# Patient Record
Sex: MALE | Race: WHITE | NOT HISPANIC OR LATINO | URBAN - METROPOLITAN AREA
[De-identification: names, ages, dates, MRNs, and addresses within clinical notes are randomized per-mention and may not be internally consistent; named-entity substitution may affect disease eponyms.]

---

## 2017-01-17 ENCOUNTER — OUTPATIENT (OUTPATIENT)
Dept: OUTPATIENT SERVICES | Facility: HOSPITAL | Age: 67
LOS: 1 days | End: 2017-01-17
Payer: COMMERCIAL

## 2017-01-17 VITALS
HEART RATE: 86 BPM | RESPIRATION RATE: 16 BRPM | TEMPERATURE: 99 F | SYSTOLIC BLOOD PRESSURE: 140 MMHG | DIASTOLIC BLOOD PRESSURE: 88 MMHG | WEIGHT: 177.91 LBS | HEIGHT: 69 IN | OXYGEN SATURATION: 97 %

## 2017-01-17 DIAGNOSIS — M87.052 IDIOPATHIC ASEPTIC NECROSIS OF LEFT FEMUR: ICD-10-CM

## 2017-01-17 DIAGNOSIS — Z01.818 ENCOUNTER FOR OTHER PREPROCEDURAL EXAMINATION: ICD-10-CM

## 2017-01-17 DIAGNOSIS — M16.12 UNILATERAL PRIMARY OSTEOARTHRITIS, LEFT HIP: ICD-10-CM

## 2017-01-17 DIAGNOSIS — Z85.820 PERSONAL HISTORY OF MALIGNANT MELANOMA OF SKIN: Chronic | ICD-10-CM

## 2017-01-17 DIAGNOSIS — Z98.890 OTHER SPECIFIED POSTPROCEDURAL STATES: Chronic | ICD-10-CM

## 2017-01-17 LAB
ANION GAP SERPL CALC-SCNC: 20 MMOL/L — HIGH (ref 5–17)
BLD GP AB SCN SERPL QL: NEGATIVE — SIGNIFICANT CHANGE UP
BUN SERPL-MCNC: 13 MG/DL — SIGNIFICANT CHANGE UP (ref 7–23)
CALCIUM SERPL-MCNC: 10.4 MG/DL — SIGNIFICANT CHANGE UP (ref 8.4–10.5)
CHLORIDE SERPL-SCNC: 96 MMOL/L — SIGNIFICANT CHANGE UP (ref 96–108)
CO2 SERPL-SCNC: 24 MMOL/L — SIGNIFICANT CHANGE UP (ref 22–31)
CREAT SERPL-MCNC: 0.99 MG/DL — SIGNIFICANT CHANGE UP (ref 0.5–1.3)
GLUCOSE SERPL-MCNC: 97 MG/DL — SIGNIFICANT CHANGE UP (ref 70–99)
HCT VFR BLD CALC: 46.7 % — SIGNIFICANT CHANGE UP (ref 39–50)
HGB BLD-MCNC: 16.4 G/DL — SIGNIFICANT CHANGE UP (ref 13–17)
MCHC RBC-ENTMCNC: 33.5 PG — SIGNIFICANT CHANGE UP (ref 27–34)
MCHC RBC-ENTMCNC: 35.1 GM/DL — SIGNIFICANT CHANGE UP (ref 32–36)
MCV RBC AUTO: 95.3 FL — SIGNIFICANT CHANGE UP (ref 80–100)
MRSA PCR RESULT.: SIGNIFICANT CHANGE UP
PLATELET # BLD AUTO: 331 K/UL — SIGNIFICANT CHANGE UP (ref 150–400)
POTASSIUM SERPL-MCNC: 4.3 MMOL/L — SIGNIFICANT CHANGE UP (ref 3.5–5.3)
POTASSIUM SERPL-SCNC: 4.3 MMOL/L — SIGNIFICANT CHANGE UP (ref 3.5–5.3)
RBC # BLD: 4.9 M/UL — SIGNIFICANT CHANGE UP (ref 4.2–5.8)
RBC # FLD: 12 % — SIGNIFICANT CHANGE UP (ref 10.3–14.5)
RH IG SCN BLD-IMP: POSITIVE — SIGNIFICANT CHANGE UP
S AUREUS DNA NOSE QL NAA+PROBE: DETECTED
SODIUM SERPL-SCNC: 139 MMOL/L — SIGNIFICANT CHANGE UP (ref 135–145)
WBC # BLD: 7.32 K/UL — SIGNIFICANT CHANGE UP (ref 3.8–10.5)
WBC # FLD AUTO: 7.32 K/UL — SIGNIFICANT CHANGE UP (ref 3.8–10.5)

## 2017-01-17 PROCEDURE — 86900 BLOOD TYPING SEROLOGIC ABO: CPT

## 2017-01-17 PROCEDURE — 86850 RBC ANTIBODY SCREEN: CPT

## 2017-01-17 PROCEDURE — 86901 BLOOD TYPING SEROLOGIC RH(D): CPT

## 2017-01-17 PROCEDURE — 87641 MR-STAPH DNA AMP PROBE: CPT

## 2017-01-17 PROCEDURE — 87640 STAPH A DNA AMP PROBE: CPT

## 2017-01-17 PROCEDURE — 85027 COMPLETE CBC AUTOMATED: CPT

## 2017-01-17 PROCEDURE — 80048 BASIC METABOLIC PNL TOTAL CA: CPT

## 2017-01-17 RX ORDER — ACETAMINOPHEN 500 MG
975 TABLET ORAL ONCE
Qty: 0 | Refills: 0 | Status: COMPLETED | OUTPATIENT
Start: 2017-02-04 | End: 2017-02-04

## 2017-01-17 RX ORDER — GABAPENTIN 400 MG/1
300 CAPSULE ORAL ONCE
Qty: 0 | Refills: 0 | Status: COMPLETED | OUTPATIENT
Start: 2017-02-04 | End: 2017-02-04

## 2017-01-17 RX ORDER — CEFAZOLIN SODIUM 1 G
2000 VIAL (EA) INJECTION ONCE
Qty: 0 | Refills: 0 | Status: DISCONTINUED | OUTPATIENT
Start: 2017-02-04 | End: 2017-02-05

## 2017-01-17 RX ORDER — TRAMADOL HYDROCHLORIDE 50 MG/1
50 TABLET ORAL ONCE
Qty: 0 | Refills: 0 | Status: DISCONTINUED | OUTPATIENT
Start: 2017-02-04 | End: 2017-02-04

## 2017-01-17 NOTE — H&P PST ADULT - HISTORY OF PRESENT ILLNESS
65 yo male with hip pain starting 1 yr ago, MRI shows avascular necrosis. Pain is now worsening and pt is scheduled for Left Hip replacement.

## 2017-01-17 NOTE — H&P PST ADULT - PSH
History of melanoma  removal of several melanoma  S/P knee surgery  left  S/P tendon repair  left arm

## 2017-01-17 NOTE — H&P PST ADULT - NSANTHOSAYNRD_GEN_A_CORE
No. JORJE screening performed.  STOP BANG Legend: 0-2 = LOW Risk; 3-4 = INTERMEDIATE Risk; 5-8 = HIGH Risk

## 2017-01-19 ENCOUNTER — OTHER (OUTPATIENT)
Age: 67
End: 2017-01-19

## 2017-01-19 RX ORDER — MUPIROCIN 20 MG/G
2 OINTMENT TOPICAL
Qty: 32 | Refills: 0 | Status: ACTIVE | COMMUNITY
Start: 2017-01-19 | End: 1900-01-01

## 2017-01-23 RX ORDER — TRAMADOL HYDROCHLORIDE 50 MG/1
50 TABLET, COATED ORAL
Qty: 30 | Refills: 1 | Status: ACTIVE | COMMUNITY
Start: 2017-01-23 | End: 1900-01-01

## 2017-01-28 PROBLEM — C43.9 MALIGNANT MELANOMA OF SKIN, UNSPECIFIED: Chronic | Status: ACTIVE | Noted: 2017-01-17

## 2017-01-28 PROBLEM — K21.9 GASTRO-ESOPHAGEAL REFLUX DISEASE WITHOUT ESOPHAGITIS: Chronic | Status: ACTIVE | Noted: 2017-01-17

## 2017-01-28 PROBLEM — I10 ESSENTIAL (PRIMARY) HYPERTENSION: Chronic | Status: ACTIVE | Noted: 2017-01-17

## 2017-01-31 ENCOUNTER — OUTPATIENT (OUTPATIENT)
Dept: OUTPATIENT SERVICES | Facility: HOSPITAL | Age: 67
LOS: 1 days | End: 2017-01-31
Payer: COMMERCIAL

## 2017-01-31 VITALS
WEIGHT: 177.91 LBS | TEMPERATURE: 99 F | HEART RATE: 102 BPM | HEIGHT: 69 IN | DIASTOLIC BLOOD PRESSURE: 102 MMHG | SYSTOLIC BLOOD PRESSURE: 172 MMHG | OXYGEN SATURATION: 98 % | RESPIRATION RATE: 18 BRPM

## 2017-01-31 DIAGNOSIS — Z98.890 OTHER SPECIFIED POSTPROCEDURAL STATES: Chronic | ICD-10-CM

## 2017-01-31 DIAGNOSIS — R93.1 ABNORMAL FINDINGS ON DIAGNOSTIC IMAGING OF HEART AND CORONARY CIRCULATION: ICD-10-CM

## 2017-01-31 DIAGNOSIS — Z85.820 PERSONAL HISTORY OF MALIGNANT MELANOMA OF SKIN: Chronic | ICD-10-CM

## 2017-01-31 PROCEDURE — C1894: CPT

## 2017-01-31 PROCEDURE — 93010 ELECTROCARDIOGRAM REPORT: CPT

## 2017-01-31 PROCEDURE — C1887: CPT

## 2017-01-31 PROCEDURE — 93005 ELECTROCARDIOGRAM TRACING: CPT

## 2017-01-31 PROCEDURE — 93458 L HRT ARTERY/VENTRICLE ANGIO: CPT | Mod: 26

## 2017-01-31 PROCEDURE — 93458 L HRT ARTERY/VENTRICLE ANGIO: CPT

## 2017-01-31 PROCEDURE — C1760: CPT

## 2017-01-31 PROCEDURE — C1769: CPT

## 2017-01-31 RX ORDER — SODIUM CHLORIDE 9 MG/ML
3 INJECTION INTRAMUSCULAR; INTRAVENOUS; SUBCUTANEOUS EVERY 8 HOURS
Qty: 0 | Refills: 0 | Status: DISCONTINUED | OUTPATIENT
Start: 2017-01-31 | End: 2017-02-15

## 2017-01-31 NOTE — H&P CARDIOLOGY - PMH
GERD (gastroesophageal reflux disease)    HTN (hypertension)    Melanoma    Melanoma    Osteoarthritis

## 2017-01-31 NOTE — H&P CARDIOLOGY - HISTORY OF PRESENT ILLNESS
67 yo male pt with PMHx of osteoarthritis, HTN, HLD planned to have left rip replacement on 2/4/17, now presents for cardiac clearance.   Pt reports he had abnormal EKG in Dr. Hillman office, referred to have cath. Currently denies chest pain or SOB.

## 2017-02-03 ENCOUNTER — RESULT REVIEW (OUTPATIENT)
Age: 67
End: 2017-02-03

## 2017-02-04 ENCOUNTER — APPOINTMENT (OUTPATIENT)
Dept: ORTHOPEDIC SURGERY | Facility: HOSPITAL | Age: 67
End: 2017-02-04

## 2017-02-04 ENCOUNTER — INPATIENT (INPATIENT)
Facility: HOSPITAL | Age: 67
LOS: 0 days | Discharge: HOME | DRG: 470 | End: 2017-02-05
Attending: ORTHOPAEDIC SURGERY | Admitting: ORTHOPAEDIC SURGERY
Payer: COMMERCIAL

## 2017-02-04 VITALS
TEMPERATURE: 98 F | HEART RATE: 79 BPM | SYSTOLIC BLOOD PRESSURE: 172 MMHG | WEIGHT: 177.91 LBS | HEIGHT: 69 IN | OXYGEN SATURATION: 99 % | DIASTOLIC BLOOD PRESSURE: 97 MMHG | RESPIRATION RATE: 18 BRPM

## 2017-02-04 DIAGNOSIS — M16.12 UNILATERAL PRIMARY OSTEOARTHRITIS, LEFT HIP: ICD-10-CM

## 2017-02-04 DIAGNOSIS — M87.052 IDIOPATHIC ASEPTIC NECROSIS OF LEFT FEMUR: ICD-10-CM

## 2017-02-04 DIAGNOSIS — Z98.890 OTHER SPECIFIED POSTPROCEDURAL STATES: Chronic | ICD-10-CM

## 2017-02-04 DIAGNOSIS — Z85.820 PERSONAL HISTORY OF MALIGNANT MELANOMA OF SKIN: Chronic | ICD-10-CM

## 2017-02-04 PROCEDURE — 27130 TOTAL HIP ARTHROPLASTY: CPT | Mod: LT

## 2017-02-04 PROCEDURE — 72170 X-RAY EXAM OF PELVIS: CPT | Mod: 26

## 2017-02-04 PROCEDURE — 88311 DECALCIFY TISSUE: CPT | Mod: 26

## 2017-02-04 PROCEDURE — 88305 TISSUE EXAM BY PATHOLOGIST: CPT | Mod: 26

## 2017-02-04 PROCEDURE — 27130 TOTAL HIP ARTHROPLASTY: CPT | Mod: AS,LT

## 2017-02-04 RX ORDER — ACETAMINOPHEN 500 MG
650 TABLET ORAL EVERY 6 HOURS
Qty: 0 | Refills: 0 | Status: DISCONTINUED | OUTPATIENT
Start: 2017-02-04 | End: 2017-02-05

## 2017-02-04 RX ORDER — CELECOXIB 200 MG/1
200 CAPSULE ORAL
Qty: 0 | Refills: 0 | Status: DISCONTINUED | OUTPATIENT
Start: 2017-02-06 | End: 2017-02-05

## 2017-02-04 RX ORDER — POLYETHYLENE GLYCOL 3350 17 G/17G
17 POWDER, FOR SOLUTION ORAL DAILY
Qty: 0 | Refills: 0 | Status: DISCONTINUED | OUTPATIENT
Start: 2017-02-04 | End: 2017-02-05

## 2017-02-04 RX ORDER — ATORVASTATIN CALCIUM 80 MG/1
1 TABLET, FILM COATED ORAL
Qty: 0 | Refills: 0 | COMMUNITY

## 2017-02-04 RX ORDER — LOSARTAN POTASSIUM 100 MG/1
50 TABLET, FILM COATED ORAL DAILY
Qty: 0 | Refills: 0 | Status: DISCONTINUED | OUTPATIENT
Start: 2017-02-04 | End: 2017-02-05

## 2017-02-04 RX ORDER — DOCUSATE SODIUM 100 MG
100 CAPSULE ORAL THREE TIMES A DAY
Qty: 0 | Refills: 0 | Status: DISCONTINUED | OUTPATIENT
Start: 2017-02-04 | End: 2017-02-05

## 2017-02-04 RX ORDER — MAGNESIUM HYDROXIDE 400 MG/1
30 TABLET, CHEWABLE ORAL DAILY
Qty: 0 | Refills: 0 | Status: DISCONTINUED | OUTPATIENT
Start: 2017-02-04 | End: 2017-02-05

## 2017-02-04 RX ORDER — TRAMADOL HYDROCHLORIDE 50 MG/1
50 TABLET ORAL EVERY 8 HOURS
Qty: 0 | Refills: 0 | Status: DISCONTINUED | OUTPATIENT
Start: 2017-02-04 | End: 2017-02-05

## 2017-02-04 RX ORDER — ONDANSETRON 8 MG/1
4 TABLET, FILM COATED ORAL ONCE
Qty: 0 | Refills: 0 | Status: DISCONTINUED | OUTPATIENT
Start: 2017-02-04 | End: 2017-02-04

## 2017-02-04 RX ORDER — HYDROMORPHONE HYDROCHLORIDE 2 MG/ML
1 INJECTION INTRAMUSCULAR; INTRAVENOUS; SUBCUTANEOUS EVERY 6 HOURS
Qty: 0 | Refills: 0 | Status: DISCONTINUED | OUTPATIENT
Start: 2017-02-04 | End: 2017-02-05

## 2017-02-04 RX ORDER — ACETAMINOPHEN 500 MG
1000 TABLET ORAL ONCE
Qty: 0 | Refills: 0 | Status: COMPLETED | OUTPATIENT
Start: 2017-02-04 | End: 2017-02-04

## 2017-02-04 RX ORDER — OXYCODONE HYDROCHLORIDE 5 MG/1
10 TABLET ORAL EVERY 4 HOURS
Qty: 0 | Refills: 0 | Status: DISCONTINUED | OUTPATIENT
Start: 2017-02-04 | End: 2017-02-05

## 2017-02-04 RX ORDER — SODIUM CHLORIDE 9 MG/ML
1000 INJECTION, SOLUTION INTRAVENOUS
Qty: 0 | Refills: 0 | Status: DISCONTINUED | OUTPATIENT
Start: 2017-02-04 | End: 2017-02-05

## 2017-02-04 RX ORDER — ASPIRIN/CALCIUM CARB/MAGNESIUM 324 MG
325 TABLET ORAL
Qty: 0 | Refills: 0 | Status: DISCONTINUED | OUTPATIENT
Start: 2017-02-04 | End: 2017-02-05

## 2017-02-04 RX ORDER — PANTOPRAZOLE SODIUM 20 MG/1
40 TABLET, DELAYED RELEASE ORAL DAILY
Qty: 0 | Refills: 0 | Status: DISCONTINUED | OUTPATIENT
Start: 2017-02-04 | End: 2017-02-05

## 2017-02-04 RX ORDER — SODIUM CHLORIDE 9 MG/ML
500 INJECTION INTRAMUSCULAR; INTRAVENOUS; SUBCUTANEOUS ONCE
Qty: 0 | Refills: 0 | Status: COMPLETED | OUTPATIENT
Start: 2017-02-05 | End: 2017-02-05

## 2017-02-04 RX ORDER — LOSARTAN POTASSIUM 100 MG/1
1 TABLET, FILM COATED ORAL
Qty: 0 | Refills: 0 | COMMUNITY

## 2017-02-04 RX ORDER — SENNA PLUS 8.6 MG/1
2 TABLET ORAL AT BEDTIME
Qty: 0 | Refills: 0 | Status: DISCONTINUED | OUTPATIENT
Start: 2017-02-04 | End: 2017-02-05

## 2017-02-04 RX ORDER — MORPHINE SULFATE 50 MG/1
4 CAPSULE, EXTENDED RELEASE ORAL ONCE
Qty: 0 | Refills: 0 | Status: DISCONTINUED | OUTPATIENT
Start: 2017-02-04 | End: 2017-02-04

## 2017-02-04 RX ORDER — ONDANSETRON 8 MG/1
4 TABLET, FILM COATED ORAL EVERY 6 HOURS
Qty: 0 | Refills: 0 | Status: DISCONTINUED | OUTPATIENT
Start: 2017-02-04 | End: 2017-02-05

## 2017-02-04 RX ORDER — CEFAZOLIN SODIUM 1 G
2000 VIAL (EA) INJECTION EVERY 8 HOURS
Qty: 0 | Refills: 0 | Status: COMPLETED | OUTPATIENT
Start: 2017-02-04 | End: 2017-02-05

## 2017-02-04 RX ORDER — SODIUM CHLORIDE 9 MG/ML
1000 INJECTION INTRAMUSCULAR; INTRAVENOUS; SUBCUTANEOUS ONCE
Qty: 0 | Refills: 0 | Status: DISCONTINUED | OUTPATIENT
Start: 2017-02-04 | End: 2017-02-04

## 2017-02-04 RX ORDER — OXYCODONE HYDROCHLORIDE 5 MG/1
5 TABLET ORAL EVERY 4 HOURS
Qty: 0 | Refills: 0 | Status: DISCONTINUED | OUTPATIENT
Start: 2017-02-04 | End: 2017-02-05

## 2017-02-04 RX ORDER — OXYCODONE HYDROCHLORIDE 5 MG/1
15 TABLET ORAL EVERY 4 HOURS
Qty: 0 | Refills: 0 | Status: DISCONTINUED | OUTPATIENT
Start: 2017-02-04 | End: 2017-02-05

## 2017-02-04 RX ORDER — HYDROMORPHONE HYDROCHLORIDE 2 MG/ML
0.5 INJECTION INTRAMUSCULAR; INTRAVENOUS; SUBCUTANEOUS
Qty: 0 | Refills: 0 | Status: DISCONTINUED | OUTPATIENT
Start: 2017-02-04 | End: 2017-02-04

## 2017-02-04 RX ORDER — METOPROLOL TARTRATE 50 MG
5 TABLET ORAL ONCE
Qty: 0 | Refills: 0 | Status: COMPLETED | OUTPATIENT
Start: 2017-02-04 | End: 2017-02-04

## 2017-02-04 RX ORDER — KETOROLAC TROMETHAMINE 30 MG/ML
15 SYRINGE (ML) INJECTION EVERY 8 HOURS
Qty: 0 | Refills: 0 | Status: COMPLETED | OUTPATIENT
Start: 2017-02-05 | End: 2017-02-05

## 2017-02-04 RX ORDER — SODIUM CHLORIDE 9 MG/ML
3 INJECTION INTRAMUSCULAR; INTRAVENOUS; SUBCUTANEOUS EVERY 8 HOURS
Qty: 0 | Refills: 0 | Status: DISCONTINUED | OUTPATIENT
Start: 2017-02-04 | End: 2017-02-04

## 2017-02-04 RX ORDER — ESOMEPRAZOLE MAGNESIUM 40 MG/1
1 CAPSULE, DELAYED RELEASE ORAL
Qty: 0 | Refills: 0 | COMMUNITY

## 2017-02-04 RX ADMIN — OXYCODONE HYDROCHLORIDE 10 MILLIGRAM(S): 5 TABLET ORAL at 19:51

## 2017-02-04 RX ADMIN — GABAPENTIN 300 MILLIGRAM(S): 400 CAPSULE ORAL at 12:02

## 2017-02-04 RX ADMIN — SODIUM CHLORIDE 100 MILLILITER(S): 9 INJECTION, SOLUTION INTRAVENOUS at 17:26

## 2017-02-04 RX ADMIN — HYDROMORPHONE HYDROCHLORIDE 0.5 MILLIGRAM(S): 2 INJECTION INTRAMUSCULAR; INTRAVENOUS; SUBCUTANEOUS at 16:55

## 2017-02-04 RX ADMIN — HYDROMORPHONE HYDROCHLORIDE 0.5 MILLIGRAM(S): 2 INJECTION INTRAMUSCULAR; INTRAVENOUS; SUBCUTANEOUS at 18:26

## 2017-02-04 RX ADMIN — HYDROMORPHONE HYDROCHLORIDE 0.5 MILLIGRAM(S): 2 INJECTION INTRAMUSCULAR; INTRAVENOUS; SUBCUTANEOUS at 16:00

## 2017-02-04 RX ADMIN — MORPHINE SULFATE 4 MILLIGRAM(S): 50 CAPSULE, EXTENDED RELEASE ORAL at 18:30

## 2017-02-04 RX ADMIN — OXYCODONE HYDROCHLORIDE 10 MILLIGRAM(S): 5 TABLET ORAL at 20:20

## 2017-02-04 RX ADMIN — MORPHINE SULFATE 4 MILLIGRAM(S): 50 CAPSULE, EXTENDED RELEASE ORAL at 19:12

## 2017-02-04 RX ADMIN — Medication 400 MILLIGRAM(S): at 21:33

## 2017-02-04 RX ADMIN — Medication 5 MILLIGRAM(S): at 17:30

## 2017-02-04 RX ADMIN — Medication 1000 MILLIGRAM(S): at 21:48

## 2017-02-04 RX ADMIN — Medication 325 MILLIGRAM(S): at 18:25

## 2017-02-04 RX ADMIN — Medication 100 MILLIGRAM(S): at 21:33

## 2017-02-04 RX ADMIN — Medication 975 MILLIGRAM(S): at 12:02

## 2017-02-04 RX ADMIN — HYDROMORPHONE HYDROCHLORIDE 0.5 MILLIGRAM(S): 2 INJECTION INTRAMUSCULAR; INTRAVENOUS; SUBCUTANEOUS at 17:00

## 2017-02-04 RX ADMIN — TRAMADOL HYDROCHLORIDE 50 MILLIGRAM(S): 50 TABLET ORAL at 12:02

## 2017-02-04 NOTE — PROVIDER CONTACT NOTE (OTHER) - SITUATION
Pt noted to have BP of 179/99, Pt states that "I took Losartan my BP mediation this morning", HR 69/mt, no C/O dizziness, headache, blurry vision, pt is asymptomatic

## 2017-02-05 ENCOUNTER — TRANSCRIPTION ENCOUNTER (OUTPATIENT)
Age: 67
End: 2017-02-05

## 2017-02-05 VITALS — SYSTOLIC BLOOD PRESSURE: 158 MMHG | DIASTOLIC BLOOD PRESSURE: 94 MMHG | HEART RATE: 81 BPM

## 2017-02-05 LAB
ANION GAP SERPL CALC-SCNC: 17 MMOL/L — SIGNIFICANT CHANGE UP (ref 5–17)
BUN SERPL-MCNC: 12 MG/DL — SIGNIFICANT CHANGE UP (ref 7–23)
CALCIUM SERPL-MCNC: 9.1 MG/DL — SIGNIFICANT CHANGE UP (ref 8.4–10.5)
CHLORIDE SERPL-SCNC: 101 MMOL/L — SIGNIFICANT CHANGE UP (ref 96–108)
CO2 SERPL-SCNC: 22 MMOL/L — SIGNIFICANT CHANGE UP (ref 22–31)
CREAT SERPL-MCNC: 0.84 MG/DL — SIGNIFICANT CHANGE UP (ref 0.5–1.3)
GLUCOSE SERPL-MCNC: 98 MG/DL — SIGNIFICANT CHANGE UP (ref 70–99)
HCT VFR BLD CALC: 37.1 % — LOW (ref 39–50)
HGB BLD-MCNC: 12.6 G/DL — LOW (ref 13–17)
MCHC RBC-ENTMCNC: 32.1 PG — SIGNIFICANT CHANGE UP (ref 27–34)
MCHC RBC-ENTMCNC: 34 GM/DL — SIGNIFICANT CHANGE UP (ref 32–36)
MCV RBC AUTO: 94.6 FL — SIGNIFICANT CHANGE UP (ref 80–100)
PLATELET # BLD AUTO: 316 K/UL — SIGNIFICANT CHANGE UP (ref 150–400)
POTASSIUM SERPL-MCNC: 4.2 MMOL/L — SIGNIFICANT CHANGE UP (ref 3.5–5.3)
POTASSIUM SERPL-SCNC: 4.2 MMOL/L — SIGNIFICANT CHANGE UP (ref 3.5–5.3)
RBC # BLD: 3.92 M/UL — LOW (ref 4.2–5.8)
RBC # FLD: 11.9 % — SIGNIFICANT CHANGE UP (ref 10.3–14.5)
SODIUM SERPL-SCNC: 140 MMOL/L — SIGNIFICANT CHANGE UP (ref 135–145)
WBC # BLD: 8.27 K/UL — SIGNIFICANT CHANGE UP (ref 3.8–10.5)
WBC # FLD AUTO: 8.27 K/UL — SIGNIFICANT CHANGE UP (ref 3.8–10.5)

## 2017-02-05 PROCEDURE — 72170 X-RAY EXAM OF PELVIS: CPT

## 2017-02-05 PROCEDURE — 80048 BASIC METABOLIC PNL TOTAL CA: CPT

## 2017-02-05 PROCEDURE — 85027 COMPLETE CBC AUTOMATED: CPT

## 2017-02-05 PROCEDURE — 88311 DECALCIFY TISSUE: CPT

## 2017-02-05 PROCEDURE — 86850 RBC ANTIBODY SCREEN: CPT

## 2017-02-05 PROCEDURE — 86900 BLOOD TYPING SEROLOGIC ABO: CPT

## 2017-02-05 PROCEDURE — 88305 TISSUE EXAM BY PATHOLOGIST: CPT

## 2017-02-05 PROCEDURE — 97161 PT EVAL LOW COMPLEX 20 MIN: CPT

## 2017-02-05 PROCEDURE — 76000 FLUOROSCOPY <1 HR PHYS/QHP: CPT

## 2017-02-05 PROCEDURE — 86901 BLOOD TYPING SEROLOGIC RH(D): CPT

## 2017-02-05 PROCEDURE — C1776: CPT

## 2017-02-05 PROCEDURE — C1889: CPT

## 2017-02-05 RX ORDER — TRAMADOL HYDROCHLORIDE 50 MG/1
1 TABLET ORAL
Qty: 21 | Refills: 0 | OUTPATIENT
Start: 2017-02-05

## 2017-02-05 RX ORDER — DOCUSATE SODIUM 100 MG
1 CAPSULE ORAL
Qty: 0 | Refills: 0 | COMMUNITY
Start: 2017-02-05

## 2017-02-05 RX ORDER — OXYCODONE HYDROCHLORIDE 5 MG/1
15 TABLET ORAL
Qty: 0 | Refills: 0 | Status: DISCONTINUED | OUTPATIENT
Start: 2017-02-05 | End: 2017-02-05

## 2017-02-05 RX ORDER — MORPHINE SULFATE 50 MG/1
1 CAPSULE, EXTENDED RELEASE ORAL
Qty: 6 | Refills: 0 | OUTPATIENT
Start: 2017-02-05 | End: 2017-02-08

## 2017-02-05 RX ORDER — SENNA PLUS 8.6 MG/1
2 TABLET ORAL
Qty: 0 | Refills: 0 | COMMUNITY
Start: 2017-02-05

## 2017-02-05 RX ORDER — TRAMADOL HYDROCHLORIDE 50 MG/1
1 TABLET ORAL
Qty: 0 | Refills: 0 | COMMUNITY

## 2017-02-05 RX ORDER — POLYETHYLENE GLYCOL 3350 17 G/17G
17 POWDER, FOR SOLUTION ORAL
Qty: 0 | Refills: 0 | COMMUNITY
Start: 2017-02-05

## 2017-02-05 RX ORDER — ACETAMINOPHEN 500 MG
2 TABLET ORAL
Qty: 0 | Refills: 0 | COMMUNITY
Start: 2017-02-05

## 2017-02-05 RX ORDER — MILK THISTLE 180 MG
1 CAPSULE ORAL
Qty: 0 | Refills: 0 | COMMUNITY

## 2017-02-05 RX ORDER — ASPIRIN/CALCIUM CARB/MAGNESIUM 324 MG
1 TABLET ORAL
Qty: 0 | Refills: 0 | COMMUNITY
Start: 2017-02-05

## 2017-02-05 RX ORDER — OXYCODONE HYDROCHLORIDE 5 MG/1
10 TABLET ORAL
Qty: 0 | Refills: 0 | Status: DISCONTINUED | OUTPATIENT
Start: 2017-02-05 | End: 2017-02-05

## 2017-02-05 RX ORDER — OXYCODONE HYDROCHLORIDE 5 MG/1
1 TABLET ORAL
Qty: 55 | Refills: 0 | OUTPATIENT
Start: 2017-02-05

## 2017-02-05 RX ORDER — MORPHINE SULFATE 50 MG/1
15 CAPSULE, EXTENDED RELEASE ORAL EVERY 12 HOURS
Qty: 0 | Refills: 0 | Status: DISCONTINUED | OUTPATIENT
Start: 2017-02-05 | End: 2017-02-05

## 2017-02-05 RX ADMIN — OXYCODONE HYDROCHLORIDE 15 MILLIGRAM(S): 5 TABLET ORAL at 00:45

## 2017-02-05 RX ADMIN — Medication 100 MILLIGRAM(S): at 05:15

## 2017-02-05 RX ADMIN — SODIUM CHLORIDE 600 MILLILITER(S): 9 INJECTION INTRAMUSCULAR; INTRAVENOUS; SUBCUTANEOUS at 06:11

## 2017-02-05 RX ADMIN — MORPHINE SULFATE 15 MILLIGRAM(S): 50 CAPSULE, EXTENDED RELEASE ORAL at 09:47

## 2017-02-05 RX ADMIN — HYDROMORPHONE HYDROCHLORIDE 1 MILLIGRAM(S): 2 INJECTION INTRAMUSCULAR; INTRAVENOUS; SUBCUTANEOUS at 03:31

## 2017-02-05 RX ADMIN — OXYCODONE HYDROCHLORIDE 15 MILLIGRAM(S): 5 TABLET ORAL at 05:46

## 2017-02-05 RX ADMIN — PANTOPRAZOLE SODIUM 40 MILLIGRAM(S): 20 TABLET, DELAYED RELEASE ORAL at 11:48

## 2017-02-05 RX ADMIN — Medication 1 TABLET(S): at 11:48

## 2017-02-05 RX ADMIN — OXYCODONE HYDROCHLORIDE 15 MILLIGRAM(S): 5 TABLET ORAL at 05:16

## 2017-02-05 RX ADMIN — Medication 325 MILLIGRAM(S): at 05:15

## 2017-02-05 RX ADMIN — LOSARTAN POTASSIUM 50 MILLIGRAM(S): 100 TABLET, FILM COATED ORAL at 05:15

## 2017-02-05 RX ADMIN — TRAMADOL HYDROCHLORIDE 50 MILLIGRAM(S): 50 TABLET ORAL at 05:45

## 2017-02-05 RX ADMIN — TRAMADOL HYDROCHLORIDE 50 MILLIGRAM(S): 50 TABLET ORAL at 05:15

## 2017-02-05 RX ADMIN — OXYCODONE HYDROCHLORIDE 15 MILLIGRAM(S): 5 TABLET ORAL at 00:15

## 2017-02-05 RX ADMIN — HYDROMORPHONE HYDROCHLORIDE 1 MILLIGRAM(S): 2 INJECTION INTRAMUSCULAR; INTRAVENOUS; SUBCUTANEOUS at 03:46

## 2017-02-05 RX ADMIN — Medication 15 MILLIGRAM(S): at 05:45

## 2017-02-05 RX ADMIN — Medication 15 MILLIGRAM(S): at 05:15

## 2017-02-05 RX ADMIN — MORPHINE SULFATE 15 MILLIGRAM(S): 50 CAPSULE, EXTENDED RELEASE ORAL at 11:50

## 2017-02-05 RX ADMIN — Medication 100 MILLIGRAM(S): at 11:48

## 2017-02-05 RX ADMIN — TRAMADOL HYDROCHLORIDE 50 MILLIGRAM(S): 50 TABLET ORAL at 13:35

## 2017-02-05 RX ADMIN — TRAMADOL HYDROCHLORIDE 50 MILLIGRAM(S): 50 TABLET ORAL at 13:36

## 2017-02-05 RX ADMIN — OXYCODONE HYDROCHLORIDE 15 MILLIGRAM(S): 5 TABLET ORAL at 09:16

## 2017-02-05 NOTE — PHYSICAL THERAPY INITIAL EVALUATION ADULT - ADDITIONAL COMMENTS
Pt resides in a prive home with his wife.  Has 2 floors, 3 steps to enter and 12 to second floor.  Son lives next door and is available to help. Pt resides in a prive home with his wife.  Has 2 floors, 3 steps to enter and 12 to second floor.  Son lives next door and is available to help.  Has a rolling walker.

## 2017-02-05 NOTE — DISCHARGE NOTE ADULT - PLAN OF CARE
improved ambulation and pain control Weight bearing as tolerated (Anterior precautions)  Please drop and dangle leg Q8hr for 45 min at a time, ankle pumps, quad sets, gluteal clenches and leg lifts  see discharge instructions

## 2017-02-05 NOTE — DISCHARGE NOTE ADULT - CARE PROVIDERS DIRECT ADDRESSES
[email or web address]

## 2017-02-05 NOTE — PROVIDER CONTACT NOTE (OTHER) - RECOMMENDATIONS
Notify MD; come assess & talk to pt. Notify MD; come assess & talk to pt. RN reinforced need to remain in hospital over night for pt safety.

## 2017-02-05 NOTE — DISCHARGE NOTE ADULT - HOME CARE AGENCY
[place] [phone] for RN visit to assess for home P/T for start of care the day after discharge

## 2017-02-05 NOTE — PROVIDER CONTACT NOTE (OTHER) - ASSESSMENT
Pt c/o being uncomfortable, c/o roommate is disruptive & requesting to be D/C'ed tonight. Pt explained need to assess vital signs, work with PT to evaluate strength & discharge needs. Pt also DTV @ 2300. Pt explained need to void to prevent need for Barry cath for urine retention.

## 2017-02-05 NOTE — DISCHARGE NOTE ADULT - ADDITIONAL INSTRUCTIONS
Keep Aquacel dressing clean and dry   ice to affected incision every 4-6 hours x 72 hours   Follow up with Dr Grider in 10 DAYS for dressing REMOVAL, wound check, and staple/suture removal (if applicable)   Follow up with your private internist / PMD in 4-6 weeks re: general checkup (and possible medication adjustment) Keep Aquacel dressing clean and dry   ice to affected incision every 4-6 hours x 72 hours   Continue ECOTRIN 325 mg by mouth 2x / day (at breakfast and at dinner) for a total of 6WEEKS   Follow up with Dr Grider in 10 DAYS for dressing REMOVAL, wound check, and staple/suture removal (if applicable)   Follow up with your private internist / PMD in 4-6 weeks re: general checkup (and possible medication adjustment)

## 2017-02-05 NOTE — DISCHARGE NOTE ADULT - CARE PLAN
Principal Discharge DX:	Idiopathic aseptic necrosis of femur, left  Goal:	improved ambulation and pain control  Instructions for follow-up, activity and diet:	Weight bearing as tolerated (Anterior precautions)  Please drop and dangle leg Q8hr for 45 min at a time, ankle pumps, quad sets, gluteal clenches and leg lifts  see discharge instructions  Secondary Diagnosis:	Primary osteoarthritis of left hip

## 2017-02-05 NOTE — DISCHARGE NOTE ADULT - CARE PROVIDER_API CALL
Gorge Grider), Orthopedics  [address] [place] [address]  Phone: [phone]  Fax: [phone]

## 2017-02-05 NOTE — DISCHARGE NOTE ADULT - NS AS ACTIVITY OBS
Walking-Indoors allowed/Stairs allowed/Walking-Outdoors allowed/Do not make important decisions/sponge bath only until OK w/ Surgeon  Call MD for excessive pain, persistent fevers, pain NOT relieved by medications.  Do not drive or lift any heavy objects/No Heavy lifting/straining

## 2017-02-05 NOTE — DISCHARGE NOTE ADULT - PATIENT PORTAL LINK FT
“You can access the FollowHealth Patient Portal, offered by [facility], by registering with the following website: [email or web address]”

## 2017-02-05 NOTE — PROVIDER CONTACT NOTE (OTHER) - ACTION/TREATMENT ORDERED:
MD made aware & came to talk with pt. MD made aware & came to talk with pt. MD ordered 15mg Oxy & Dilaudid 1mg IVP for breakthrough. RN will talk to roommate about keeping noise down. Pt states understanding of need to stay in hospital.

## 2017-02-05 NOTE — DISCHARGE NOTE ADULT - HOSPITAL COURSE
Chief Complaint/Reason for Admission	I have a bad hip	    History of Present Illness:  HPI		  67 yo male with hip pain starting 1 yr ago, MRI shows avascular necrosis. Pain is now worsening and pt is scheduled for Left Hip replacement.      Allergies/Medications:   Allergies:        Allergies:  	No Known Allergies:     Home Medications:   * Patient Currently Takes Medications as of 17-Jan-2017 09:08 documented in Prescription Writer  · 	losartan 50 mg oral tablet: Last Dose Taken:  , 1 tab(s) orally once a day  · 	NexIUM 40 mg oral delayed release capsule: Last Dose Taken:  , 1 cap(s) orally once a day  · 	traMADol 50 mg oral tablet: Last Dose Taken:  , 1 tab(s) orally 2 times a day    PMH/PSH/FH/SH:   Past Medical History:  GERD (gastroesophageal reflux disease)    HTN (hypertension)    Melanoma.    Past Surgical History:  History of melanoma  removal of several melanoma  S/P knee surgery  left  S/P tendon repair  left arm.    Hospital Course:   2/4:  Pt underwent L WON ([facility]) w/ Dr Grider. No complications  2/5:  Eval by PT:  WBAT         Pt cleared and stable for d/c -> home Chief Complaint/Reason for Admission	I have a bad hip	    History of Present Illness:  HPI		  65 yo male with hip pain starting 1 yr ago, MRI shows avascular necrosis. Pain is now worsening and pt is scheduled for Left Hip replacement.      Allergies/Medications:   Allergies:        Allergies:  	No Known Allergies:     Home Medications:   * Patient Currently Takes Medications as of 17-Jan-2017 09:08 documented in Prescription Writer  · 	losartan 50 mg oral tablet: Last Dose Taken:  , 1 tab(s) orally once a day  · 	NexIUM 40 mg oral delayed release capsule: Last Dose Taken:  , 1 cap(s) orally once a day  · 	traMADol 50 mg oral tablet: Last Dose Taken:  , 1 tab(s) orally 2 times a day    PMH/PSH/FH/SH:   Past Medical History:  GERD (gastroesophageal reflux disease)    HTN (hypertension)    Melanoma.    Past Surgical History:  History of melanoma  removal of several melanoma  S/P knee surgery  left  S/P tendon repair  left arm.    Hospital Course:   2/4:  Pt underwent L WON (BRENNA) w/ Dr Grider. No complications  2/5:  Eval by PT:  WBAT         Pt cleared and stable for d/c -> home

## 2017-02-08 LAB — SURGICAL PATHOLOGY STUDY: SIGNIFICANT CHANGE UP

## 2017-02-08 RX ORDER — OXYCODONE AND ACETAMINOPHEN 5; 325 MG/1; MG/1
5-325 TABLET ORAL
Qty: 20 | Refills: 0 | Status: ACTIVE | COMMUNITY
Start: 2017-02-08 | End: 1900-01-01

## 2017-02-14 ENCOUNTER — APPOINTMENT (OUTPATIENT)
Dept: ORTHOPEDIC SURGERY | Facility: CLINIC | Age: 67
End: 2017-02-14

## 2017-02-14 VITALS — HEIGHT: 69 IN | WEIGHT: 175 LBS | BODY MASS INDEX: 25.92 KG/M2

## 2017-02-14 DIAGNOSIS — Z96.642 PRESENCE OF LEFT ARTIFICIAL HIP JOINT: ICD-10-CM

## 2017-02-14 RX ORDER — ALPRAZOLAM 2 MG/1
2 TABLET ORAL
Qty: 10 | Refills: 0 | Status: ACTIVE | COMMUNITY
Start: 2017-02-14 | End: 1900-01-01

## 2017-02-15 RX ORDER — OXYCODONE AND ACETAMINOPHEN 5; 325 MG/1; MG/1
5-325 TABLET ORAL
Qty: 30 | Refills: 0 | Status: ACTIVE | COMMUNITY
Start: 2017-02-15 | End: 1900-01-01

## 2017-03-08 ENCOUNTER — APPOINTMENT (OUTPATIENT)
Dept: ORTHOPEDIC SURGERY | Facility: CLINIC | Age: 67
End: 2017-03-08

## 2017-03-08 DIAGNOSIS — Z96.642 PRESENCE OF LEFT ARTIFICIAL HIP JOINT: ICD-10-CM

## 2017-03-08 RX ORDER — DIAZEPAM 2 MG/1
2 TABLET ORAL
Qty: 20 | Refills: 0 | Status: ACTIVE | COMMUNITY
Start: 2017-03-08 | End: 1900-01-01

## 2017-05-18 ENCOUNTER — APPOINTMENT (OUTPATIENT)
Dept: ORTHOPEDIC SURGERY | Facility: CLINIC | Age: 67
End: 2017-05-18

## 2017-06-06 ENCOUNTER — APPOINTMENT (OUTPATIENT)
Dept: ORTHOPEDIC SURGERY | Facility: CLINIC | Age: 67
End: 2017-06-06

## 2017-06-10 NOTE — PRE-OP CHECKLIST - HAIR REMOVAL
06/15/17        Елена Weber  [address] [place] [id number]          Patient:  Елена Weber : 1957  MR#: [id number]      Dear Елена:    We have been unable to reach you by phone.  Please contact the office at your earliest convenience to discuss recent lab results, it is very important for you to return a call to us.      Sincerely,          Torres Hughes MD  [address] [address]  [phone]  
hair removal not indicated

## 2018-04-25 ENCOUNTER — APPOINTMENT (OUTPATIENT)
Dept: ORTHOPEDIC SURGERY | Facility: CLINIC | Age: 68
End: 2018-04-25
Payer: MEDICARE

## 2018-04-25 VITALS — BODY MASS INDEX: 25.92 KG/M2 | WEIGHT: 175 LBS | HEIGHT: 69 IN

## 2018-04-25 DIAGNOSIS — M25.551 PAIN IN RIGHT HIP: ICD-10-CM

## 2018-04-25 PROCEDURE — 99214 OFFICE O/P EST MOD 30 MIN: CPT

## 2018-04-25 PROCEDURE — 73502 X-RAY EXAM HIP UNI 2-3 VIEWS: CPT | Mod: RT

## 2019-01-24 PROBLEM — C43.9 MALIGNANT MELANOMA OF SKIN, UNSPECIFIED: Chronic | Status: ACTIVE | Noted: 2017-01-31

## 2019-01-24 PROBLEM — M19.90 UNSPECIFIED OSTEOARTHRITIS, UNSPECIFIED SITE: Chronic | Status: ACTIVE | Noted: 2017-01-31

## 2019-01-30 ENCOUNTER — APPOINTMENT (OUTPATIENT)
Dept: ORTHOPEDIC SURGERY | Facility: CLINIC | Age: 69
End: 2019-01-30
Payer: MEDICARE

## 2019-01-30 VITALS
HEIGHT: 69 IN | SYSTOLIC BLOOD PRESSURE: 168 MMHG | BODY MASS INDEX: 27.85 KG/M2 | WEIGHT: 188 LBS | HEART RATE: 88 BPM | DIASTOLIC BLOOD PRESSURE: 99 MMHG

## 2019-01-30 LAB — ERYTHROCYTE [SEDIMENTATION RATE] IN BLOOD BY WESTERGREN METHOD: 3 MM/HR

## 2019-01-30 PROCEDURE — 99214 OFFICE O/P EST MOD 30 MIN: CPT

## 2019-01-30 PROCEDURE — 73502 X-RAY EXAM HIP UNI 2-3 VIEWS: CPT

## 2019-01-30 NOTE — PHYSICAL EXAM
[de-identified] : Patient is well nourished, well-developed, in no acute distress, with appropriate mood and affect. The patient is oriented to time, place, and person. Respirations are even and unlabored. Gait evaluation does not reveal a limp. There is no inguinal adenopathy. Examination of the contralateral hip shows normal range of motion, strength, no tenderness, and intact skin. The affected limb is well-perfused and showed 2+ dp/pt pulses, without skin lesions, shows a grossly normal motor and sensory examination. Examination of the hip shows a well-healed anteriorly based surgical scar. Hip motion is full and painless from 0-90 degrees extension to flexion, 20 degrees adduction and 20 degrees abduction, and 15 degrees internal and 30 degrees external rotation. Leg lengths are approximately equal. FADIR is negative and KAYLEEN is negative. Stinchfield test is negative. Both hips are stable and muscle strength is normal with good strength with resisted abduction and adduction. Pedal pulses are palpable. [de-identified] : AP pelvis, AP hip, and lateral x-rays of the left hip were reviewed. Satisfactory position and alignment of the components are present. No signs of loosening are seen.  Chauhan grade 3 heterotopic ossification is seen.

## 2019-01-30 NOTE — DISCUSSION/SUMMARY
[de-identified] : This patient has a painful left total hip arthroplasty with heterotopic ossification.  He is never had an infection workup and so today we performed an ESR and CRP.  I also would like to obtain a bone scan to evaluate for component loosening.  Follow-up after the above studies are performed to review imaging and plan next steps.

## 2019-01-30 NOTE — HISTORY OF PRESENT ILLNESS
[de-identified] : This is very nice 68-year-old gentleman experiencing chronic left hip and thigh pain, which is severe in intensity.  He underwent a left total hip arthroplasty by Dr. Grider approximately 2 years ago and never fully recovered.  He has chronic numbness in the lateral femoral cutaneous nerve distribution.  He complains of thigh stiffness and pain with exercise.  This is not start up pain.  The pain is intermittent but does limit his activities of daily living when it is present and walking tolerance is somewhat reduced.  Despite this he still able to bicycle ride.  He does not have groin pain.  The pain does not radiate down his leg and it is not associated with numbness or tingling or weakness.  He does not use a cane or walker and has not recently done any physical therapy.  No fevers or chills.

## 2019-01-31 ENCOUNTER — OTHER (OUTPATIENT)
Age: 69
End: 2019-01-31

## 2019-01-31 LAB — CRP SERPL-MCNC: 0.14 MG/DL

## 2019-02-27 ENCOUNTER — APPOINTMENT (OUTPATIENT)
Dept: ORTHOPEDIC SURGERY | Facility: CLINIC | Age: 69
End: 2019-02-27
Payer: MEDICARE

## 2019-02-27 VITALS — HEIGHT: 69 IN | BODY MASS INDEX: 27.85 KG/M2 | WEIGHT: 188 LBS

## 2019-02-27 DIAGNOSIS — M54.5 LOW BACK PAIN: ICD-10-CM

## 2019-02-27 DIAGNOSIS — M87.052 IDIOPATHIC ASEPTIC NECROSIS OF LEFT FEMUR: ICD-10-CM

## 2019-02-27 DIAGNOSIS — Z96.649 PAIN DUE TO INTERNAL ORTHOPEDIC PROSTHETIC DEVICES, IMPLANTS AND GRAFTS, INITIAL ENCOUNTER: ICD-10-CM

## 2019-02-27 DIAGNOSIS — T84.84XA PAIN DUE TO INTERNAL ORTHOPEDIC PROSTHETIC DEVICES, IMPLANTS AND GRAFTS, INITIAL ENCOUNTER: ICD-10-CM

## 2019-02-27 PROCEDURE — 99214 OFFICE O/P EST MOD 30 MIN: CPT

## 2019-02-27 NOTE — HISTORY OF PRESENT ILLNESS
[de-identified] : This is very nice 68-year-old gentleman experiencing chronic left hip and thigh pain, which is severe in intensity.  He underwent a left total hip arthroplasty by Dr. Grider approximately 2 years ago and never fully recovered.  He has chronic numbness in the lateral femoral cutaneous nerve distribution.  He complains of thigh stiffness and pain with exercise.  This is not start up pain.  The pain mainly comes and goes when he is standing still and is intermittent but does limit his activities of daily living when it is present and walking tolerance is somewhat reduced.  Despite this he still able to bicycle ride.  He does not have groin pain.  The pain does not radiate down his leg and it is not associated with numbness or tingling or weakness.  He does not use a cane or walker and has not recently done any physical therapy.  No fevers or chills.  We previously performed an ESR and CRP on him which were normal.  He comes in today to a reviewing of the bone scan.

## 2019-02-27 NOTE — REASON FOR VISIT
[Follow-Up Visit] : a follow-up visit for [Artificial Hip Joint] : artificial hip joint [Hip Pain] : hip pain

## 2019-02-27 NOTE — PHYSICAL EXAM
[de-identified] : Patient is well nourished, well-developed, in no acute distress, with appropriate mood and affect. The patient is oriented to time, place, and person. Respirations are even and unlabored. Gait evaluation does not reveal a limp. There is no inguinal adenopathy. Examination of the contralateral hip shows normal range of motion, strength, no tenderness, and intact skin. The affected limb is well-perfused and showed 2+ dp/pt pulses, without skin lesions, shows a grossly normal motor and sensory examination. Examination of the hip shows a well-healed anteriorly based surgical scar. Hip motion is full and painless from 0-90 degrees extension to flexion, 20 degrees adduction and 20 degrees abduction, and 15 degrees internal and 30 degrees external rotation. Leg lengths are approximately equal. FADIR is negative and KAYLEEN is negative. Stinchfield test is negative. Both hips are stable and muscle strength is normal with good strength with resisted abduction and adduction. Pedal pulses are palpable. [de-identified] : AP pelvis, AP hip, and lateral x-rays of the left hip were reviewed. Satisfactory position and alignment of the components are present. No signs of loosening are seen.  Chauhan grade 3 heterotopic ossification is seen.\par \par Patient brings with him a report of the bone scan which shows mildly increased uptake around portions of the stem but not the entire stem on the third phase of the bone scan but not on the first 2 phases which is only mildly consistent with possible loosening it is not definitive for loosening.  Also shows increased uptake around heterotopic ossification indicating that this is still hot.

## 2019-02-27 NOTE — DISCUSSION/SUMMARY
[de-identified] : This patient has a painful left total hip arthroplasty with heterotopic ossification which is still forming.  No evidence of infection.  The bone scan was inconclusive.  I would like to prescribe him Celebrex to see if this helps improve the pain.  He does report to me that he has a history of a spine injury and so I would like to obtain an MRI of the lumbosacral spine to evaluate for a spinal stenosis.  After he obtains these images he will come back in for another follow-up so that we would comply next steps.

## 2019-03-27 ENCOUNTER — RX RENEWAL (OUTPATIENT)
Age: 69
End: 2019-03-27

## 2019-04-03 ENCOUNTER — APPOINTMENT (OUTPATIENT)
Dept: ORTHOPEDIC SURGERY | Facility: CLINIC | Age: 69
End: 2019-04-03
Payer: MEDICARE

## 2019-04-03 VITALS — HEIGHT: 69 IN | BODY MASS INDEX: 26.66 KG/M2 | WEIGHT: 180 LBS

## 2019-04-03 DIAGNOSIS — M48.061 SPINAL STENOSIS, LUMBAR REGION WITHOUT NEUROGENIC CLAUDICATION: ICD-10-CM

## 2019-04-03 PROCEDURE — 99214 OFFICE O/P EST MOD 30 MIN: CPT

## 2019-04-03 RX ORDER — FLUOROURACIL 50 MG/G
5 CREAM TOPICAL
Qty: 40 | Refills: 0 | Status: ACTIVE | COMMUNITY
Start: 2019-04-02

## 2019-04-03 RX ORDER — TRIAMCINOLONE ACETONIDE 1 MG/G
0.1 CREAM TOPICAL
Qty: 80 | Refills: 0 | Status: ACTIVE | COMMUNITY
Start: 2019-03-07

## 2019-04-03 NOTE — DISCUSSION/SUMMARY
[de-identified] : This patient has a painful left total hip arthroplasty with heterotopic ossification which is still forming.  No evidence of infection.  The bone scan was inconclusive.  Celebrex is significantly helped to improve the pain and thus I do not think this is a case of implant loosening.  I feel that the pain is consistent with spinal stenosis given the findings on MRI.  I have referred him to physiatry for consideration of epidural steroid injections or selective nerve root injections.

## 2019-04-03 NOTE — PHYSICAL EXAM
[de-identified] : Patient is well nourished, well-developed, in no acute distress, with appropriate mood and affect. The patient is oriented to time, place, and person. Respirations are even and unlabored. Gait evaluation does not reveal a limp. There is no inguinal adenopathy. Examination of the contralateral hip shows normal range of motion, strength, no tenderness, and intact skin. The affected limb is well-perfused and showed 2+ dp/pt pulses, without skin lesions, shows a grossly normal motor and sensory examination. Examination of the hip shows a well-healed anteriorly based surgical scar. Hip motion is full and painless from 0-90 degrees extension to flexion, 20 degrees adduction and 20 degrees abduction, and 15 degrees internal and 30 degrees external rotation. Leg lengths are approximately equal. FADIR is negative and KAYLEEN is negative. Stinchfield test is negative. Both hips are stable and muscle strength is normal with good strength with resisted abduction and adduction. Pedal pulses are palpable. [de-identified] : AP pelvis, AP hip, and lateral x-rays of the left hip were reviewed. Satisfactory position and alignment of the components are present. No signs of loosening are seen.  Chauhan grade 3 heterotopic ossification is seen.\par \par I have previously reviewed the patient's bone scan which shows mildly increased uptake around portions of the stem but not the entire stem on the third phase of the bone scan but not on the first 2 phases which is only mildly consistent with possible loosening it is not definitive for loosening.  Also shows increased uptake around heterotopic ossification indicating that this is still hot.\par \par The patient brings with him an MRI of the lumbar spine that demonstrates severe L5-S1 stenosis with paracentral disc herniations bilaterally.

## 2019-04-03 NOTE — HISTORY OF PRESENT ILLNESS
[de-identified] : This is very nice 68-year-old gentleman experiencing chronic left hip and thigh pain, which is severe in intensity.  He underwent a left total hip arthroplasty by Dr. Grider approximately 2 years ago and never fully recovered.  He has chronic numbness in the lateral femoral cutaneous nerve distribution.  He complains of thigh stiffness and pain with exercise.  This is not start up pain.  The pain mainly comes and goes when he is standing still and is intermittent but does limit his activities of daily living when it is present and walking tolerance is somewhat reduced.  Despite this he still able to bicycle ride.  He does not have groin pain.  The pain does not radiate down his leg and it is not associated with numbness or tingling or weakness.  He does not use a cane or walker and has not recently done any physical therapy.  No fevers or chills.  We previously performed an ESR and CRP on him which were normal.  We have previously performed a bone scan that was inconclusive for femoral component loosening.  I did prescribe Celebrex which has significantly helped to improve the pain almost completely.  No longer has spasms in the leg.  Is here for MRI review as well.

## 2019-04-24 ENCOUNTER — MOBILE ON CALL (OUTPATIENT)
Age: 69
End: 2019-04-24

## 2019-04-24 ENCOUNTER — RX RENEWAL (OUTPATIENT)
Age: 69
End: 2019-04-24

## 2019-04-24 RX ORDER — CELECOXIB 100 MG/1
100 CAPSULE ORAL
Qty: 60 | Refills: 0 | Status: ACTIVE | COMMUNITY
Start: 2019-02-27 | End: 1900-01-01

## 2022-10-11 NOTE — H&P PST ADULT - OPHTHALMOLOGIC
negative Propranolol Pregnancy And Lactation Text: This medication is Pregnancy Category C and it isn't known if it is safe during pregnancy. It is excreted in breast milk.

## 2023-01-03 NOTE — PATIENT PROFILE ADULT. - AS [place] [employer]
You can access the FollowMyHealth Patient Portal offered by [facility] by registering at the following website: [email or web address]. By joining [employer]’s FollowMyHealth portal, you will also be able to view your health information using other applications (apps) compatible with our system.
(3) slightly limited